# Patient Record
Sex: MALE | Race: WHITE | URBAN - METROPOLITAN AREA
[De-identification: names, ages, dates, MRNs, and addresses within clinical notes are randomized per-mention and may not be internally consistent; named-entity substitution may affect disease eponyms.]

---

## 2019-03-01 ENCOUNTER — TRANSFERRED RECORDS (OUTPATIENT)
Dept: HEALTH INFORMATION MANAGEMENT | Facility: CLINIC | Age: 69
End: 2019-03-01

## 2019-03-13 ENCOUNTER — TELEPHONE (OUTPATIENT)
Dept: GASTROENTEROLOGY | Facility: CLINIC | Age: 69
End: 2019-03-13

## 2019-03-13 NOTE — TELEPHONE ENCOUNTER
[facility]     [facility]: [facility], provider [facility]    Referred to: [facility]     Provider Requested: NA      Referral Received: 3/13/19     Records received: 3/13/19, scanned     Images received: CT and US in PACs- had MRI 3/14, is being sent ASAP    Evaluation for: Pancreatic Mass, liver mass on CT     Clinical History (per RN review):     Per Patient  Pain for  1.5 years on right side, felt like kidney stones. Some work up in 2018, blamed on hiatal hernia.  Pain still persistent, pursued more imaging, now more in stomach. No nausea/vomiting. Early satiety, unknown weight loss. Denies jaundice.     [facility]    CT Abd/Pelvis 3/1/19    Impression:  1. There is an 8.5mm nonenhancing mass on the tail of the pancreas.Extrahepatic common bile duct is 1.1cm. An obstructing mass at the distal common bile duct is not seen. There is a 1.5 cm low- attenuation mass of the posterior right lobe of the liver superiorly. Recommend MRI abdomen/MRCP with and without contrast to evaulate all these areas.     2. A couple small right renal masses favor cysts but are too small to characterize. These also can be seen on abdomen MRI    ABD US 2/19/19    Liver: There is a 1.5cm low-attenuation mass of the posterior right lobe on the liver superiorly at the dome of the diaphragm. This was not seen on prior ultrasound. However focused ultrasound was not performed at that location.    Impression:  1. Dilation of extrahepatic common bile duct measuring up to 1.2cm with mild intrahepatic biliary dilation. The pancreas is not well visualized secondary to overlying bowel gas. Recommend pancreatic mass protocol MRI or CT to further eval.     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     MD review date:  3/15/19    MD Decision for clinic consultation/Orders:            Referral updates/Patient contacted: 3/13/19 pt called           [facility] Call Center    Phone Message    May a detailed message be left on voicemail: Unknown     Reason for Call: Other: [facility]      Referring/Requesting provider and Health care System:    [facility]: [facility]    Clinic contact - Phone Number:  [phone]   [phone]    Requested provider:   None    Has patient been evaluated in clinic or had a procedure [facility] provider in the last 5 years: Unknown      Indication/Diagnosis for consultation:  Pancreatic Mass & Liver Mass on CT    Is diagnosis on list of approved diagnosis:    Yes    Has patient been evaluated by another Gastroenterologist?    Unknown      CT scan     Yes: 1/26/2018     MRI         No    Upper Endoscopy/EGD   No     Endoscopic Ultrasound/EUS Yes: 2/12/2019    ERCP      No    Colonoscopy    No    Are images able/being pushed to our system?   No     Is patient aware of request for clinic consultation and ok to be contacted to schedule?  Unknown     Action Taken: Message routed to:  [facility] KO

## 2019-03-14 ENCOUNTER — TRANSFERRED RECORDS (OUTPATIENT)
Dept: HEALTH INFORMATION MANAGEMENT | Facility: CLINIC | Age: 69
End: 2019-03-14

## 2019-03-15 ENCOUNTER — CARE COORDINATION (OUTPATIENT)
Dept: GASTROENTEROLOGY | Facility: CLINIC | Age: 69
End: 2019-03-15

## 2019-03-15 DIAGNOSIS — K86.89 PANCREATIC MASS: Primary | ICD-10-CM

## 2019-03-18 ENCOUNTER — DOCUMENTATION ONLY (OUTPATIENT)
Dept: GASTROENTEROLOGY | Facility: CLINIC | Age: 69
End: 2019-03-18

## 2019-03-20 ENCOUNTER — TELEPHONE (OUTPATIENT)
Dept: GASTROENTEROLOGY | Facility: CLINIC | Age: 69
End: 2019-03-20

## 2019-03-20 ENCOUNTER — CARE COORDINATION (OUTPATIENT)
Dept: GASTROENTEROLOGY | Facility: CLINIC | Age: 69
End: 2019-03-20

## 2019-03-20 NOTE — TELEPHONE ENCOUNTER
Called Kayy to follow up regarding referral for Guillaume. Providers are not pursuing a procedure at this time due to a reassuring MRI from last week.Will update patient.     Latha Kennedy RN Care Coordinator

## 2019-03-20 NOTE — TELEPHONE ENCOUNTER
[facility] Call Center    Phone Message    May a detailed message be left on voicemail: yes    Reason for Call: Other: Kayy from the [facility] is calling because the referring provider wants this pt seen asap. Please call pt for scheduling; Kayy would also like a call to inform her once pt has been scheduled. Thanks!      Action Taken: Message routed to:  [facility] Baldo Hernandez

## 2019-03-20 NOTE — TELEPHONE ENCOUNTER
Referral reviewed by Dr. Jayme Mireles. Although we had originally discussed and EUS, MRI from 3/14 is reassuring and Dr Mireles feels that a procedure is not currently necessary.     Reviewed findings of MRI of pancreatic cyst that can be monitored again in 1 year and a liver hemangioma that doesn't require intervention.     On speaker phone, told patient and wife that can follow up in clinic and we can discuss options if they choose.    Pt/wife state the pain over the patients kidney is very bad, he's taking oxycodone every 6 hours and asked if I could explain the MRI read of the kidney. I said I could not, but if they wanted to pursue that they should [facility] for [facility] and ask for referral to nephrology. I stressed the importance of getting clearance from the [facility] before seeing an MD outside that system to ensure coverage.     Pt/wife stated they understood and will follow up with [facility] for concerns about Guillaume's kidneys.    Latha Kennedy, RN Care Coordinator

## 2019-03-21 ENCOUNTER — CARE COORDINATION (OUTPATIENT)
Dept: GASTROENTEROLOGY | Facility: CLINIC | Age: 69
End: 2019-03-21

## 2019-03-21 DIAGNOSIS — R10.84 ABDOMINAL PAIN, GENERALIZED: Primary | ICD-10-CM

## 2019-03-21 NOTE — PROGRESS NOTES
Conference call with Kayy from [facility], Geeta PORTILLO, referring MD's RN and pts wife, Shelly Murphy.    Reviewed referral for care into our system. [facility] is requesting that pts referral be sent on to general surgery, as that was their original request. Based on most recent imaging, MRI, no panc/bili interventions at this time. Offered to follow up in clinic here but [facility] requesting that I forward case on to General Surgery, as patient is in extreme amounts of pain on his right side and [facility] has no other options. Requesting we forward in the interest of efficiency in getting care/relief for the patient.    Will place order for Gen Surg per [facility] request.     Latha Kennedy, LINDSEY Care Coordinator

## 2019-03-21 NOTE — PROGRESS NOTES
[facility] Referral # [id number]    Called ENRRIQUE Sultana of [facility], [phone]

## 2019-03-22 ENCOUNTER — DOCUMENTATION ONLY (OUTPATIENT)
Dept: SURGERY | Facility: CLINIC | Age: 69
End: 2019-03-22

## 2019-03-22 ENCOUNTER — TELEPHONE (OUTPATIENT)
Dept: SURGERY | Facility: CLINIC | Age: 69
End: 2019-03-22

## 2019-03-22 ENCOUNTER — DOCUMENTATION ONLY (OUTPATIENT)
Dept: CARE COORDINATION | Facility: CLINIC | Age: 69
End: 2019-03-22

## 2019-03-22 NOTE — PROGRESS NOTES
This writer called the [facility] to see if they would be able to send the patient's insurance information. This writer left a message asking them to call [phone] to discuss.

## 2019-03-22 NOTE — TELEPHONE ENCOUNTER
Patient called and confirmed the appointment on 04/12/2019 would work for him. He requested a text confirmation and this writer offered to send an email. The following information was sent to the patient:  Adama Galaviz,    Thank you for returning my call. You are scheduled to see Dr. Lorne Cobian on 04/12/2019 at 12:30 pm. We do ask our patients to check in 15 minutes before their appointment. We do request that you bring your insurance and a photo ID to your appointment.  Our clinic is on the fourth floor and the address is:    [address] [place] [id number]    Please let me know if you have any questions or concerns.     Sincerely,  Shea

## 2019-03-22 NOTE — TELEPHONE ENCOUNTER
"----- Message from Brenda Gil RN sent at 3/22/2019 11:55 AM CDT -----  Regarding: FW: [facility] Referral - [facility]  Can you please schedule him for a consult for abdominal pain? I think he would be best served with .    I need to watch for records from [facility].    Brenda  ----- Message -----  From: Latha Kennedy RN  Sent: 3/22/2019  11:36 AM  To: Brenda Gil RN  Subject: RE: Gen Surg Referral - [facility]                       All records sent have been scanned but [facility] didn't send a ton. Most recent imaging on PACs (US, CT and MRI) Details of imaging and history in my referral note on 3/13. 3/19/19.MRI read scanned in under Media. Labs so far have been unremarkable from a panc/bili perspective.    He originally referred here for pain- pancreatic mass on turned out to be a cyst that we can monitor. Liver \"mass\" is hemangioma, non- concerning. His MRI on 3/19 was received after we started his referral and was reassuring, no interventions recommended.    Pain is persistent, but comes and goes, mostly on his right flank. He also has low back pain and when both flare, he's miserable. Feels backed up and like he can't swallow sometimes, is s/p Robinson for hiatal hernia.      MRI read suggests renal cyst (?) in the upper pole of right kidney. I suggested referral to nephrology, [facility] persisted that they asked for Gen Surg and want to continue that. Wife and patient wondering if that is cause of pain. He's using oxycodone every 4 hours, his [facility] doc seems to be out of ideas. Last colonscopy was in 2018 and unremarkable (didn't get imaging but was on call with [facility] RN\"s yesterday and they mentioned it)    Let me know if you have other questions- really nice israel and wife, [facility] unsure how to help them.     Thank you!  Latha       ----- Message -----  From: Brenda Gil RN  Sent: 3/22/2019  11:16 AM  To: Latha Kennedy RN  Subject: RE: Gen Surg Referral - [facility]                       Latha,    We need records to review to determine which of our " providers should see him- where is the pain, etc?  I see nothing scanned into Epic other than some imaging/lab.  Do you have them?    Thanks,  Brenda    ----- Message -----  From: Latha Kennedy RN  Sent: 3/22/2019  10:57 AM  To: Brenda Gil RN  Subject: Gen Surg Referral - [facility]                           Hi-    We placed Gen Surg referral yesterday for this patient on the request of [facility] to expedite care. Man with ongoing pain, panc and liver work up unremarkable and no intervention from panc/bili. [facility] would like Gen Surg to review. Should all be in the chart.    Let me know if you have questions. Thanks for your help.    Latha Kennedy, RN Care Coordinator

## 2019-03-22 NOTE — TELEPHONE ENCOUNTER
Per task, called patient to schedule a clinic consult with Dr. Colton Cobian , there was no answer. Left message with an appointment on 04/12/2019 and this writer's direct line [phone]. Patient was asked to call to confirm.

## 2019-03-27 ENCOUNTER — TELEPHONE (OUTPATIENT)
Dept: SURGERY | Facility: CLINIC | Age: 69
End: 2019-03-27

## 2019-03-27 NOTE — TELEPHONE ENCOUNTER
This writer received a call from Kayy at the [facility] about the patient. This writer confirmed the patient has an appointment with Dr. Lorne Cobian on 04/12/2019 at 12:30 pm. She appreciated this information and can be contacted at [phone] ext. [phone].

## 2019-04-03 ENCOUNTER — PATIENT OUTREACH (OUTPATIENT)
Dept: SURGERY | Facility: CLINIC | Age: 69
End: 2019-04-03

## 2019-04-03 NOTE — PROGRESS NOTES
Patient is scheduled to see Dr. Cobian on 4/12 for evaluation of abdominal pain.  No records available in Epic other than imaging and lab.  Provider in need of OP and provider notes.     Attempted to contact Alesha at the [facility] with no answer.  LM on  to call office and in need of records for appointment.  Await return call.    Reason for Call: Other: [facility]      Referring/Requesting provider and Health care System:    [facility]: [facility]     Clinic contact - Phone Number:  [phone]   [phone]

## 2019-04-05 NOTE — PROGRESS NOTES
LM x 2 to obtain records for upcoming appointment with Dr. Cobian at [facility].

## 2019-04-09 ENCOUNTER — TELEPHONE (OUTPATIENT)
Dept: SURGERY | Facility: CLINIC | Age: 69
End: 2019-04-09

## 2019-04-09 NOTE — TELEPHONE ENCOUNTER
[facility] Call Center    Phone Message    May a detailed message be left on voicemail: yes    Reason for Call: Other: Patient's wife Dennise called to reschedule pt's 4/12/19 appointment with Dr. Cobian due to incoming weather. Travel will be too difficult.  Patient needs to be worked into Dr. Cobian's schedule as next available is 5/13/19.  Please call Dennise at [phone] or Guillaume at [phone] to reschedule for next week.    Action Taken: Message routed to:  [facility] General Surgery

## 2019-04-09 NOTE — PROGRESS NOTES
Attempted to reach [facility] with no answer. Left detailed message requesting medical records for appointment with Dr. Cobian 4/12.    KEN provided with fax and direct contact numbers.

## 2019-04-10 ENCOUNTER — PATIENT OUTREACH (OUTPATIENT)
Dept: SURGERY | Facility: CLINIC | Age: 69
End: 2019-04-10

## 2019-04-10 ENCOUNTER — TELEPHONE (OUTPATIENT)
Dept: SURGERY | Facility: CLINIC | Age: 69
End: 2019-04-10

## 2019-04-10 RX ORDER — ASPIRIN 81 MG/1
81 TABLET ORAL DAILY
COMMUNITY

## 2019-04-10 RX ORDER — MULTIVITAMIN,THERAPEUTIC
1 TABLET ORAL DAILY
COMMUNITY

## 2019-04-10 RX ORDER — SODIUM FLUORIDE 5 MG/G
GEL, DENTIFRICE DENTAL AT BEDTIME
COMMUNITY

## 2019-04-10 RX ORDER — ACETAMINOPHEN 325 MG/1
325-650 TABLET ORAL EVERY 6 HOURS PRN
COMMUNITY

## 2019-04-10 RX ORDER — CALCIUM POLYCARBOPHIL 625 MG 625 MG/1
1 TABLET ORAL DAILY
COMMUNITY

## 2019-04-10 NOTE — TELEPHONE ENCOUNTER
Appointment rescheduled to 4/18 at 0830 with Dr. Soto.  Dr. Cobian not available in time period patient requesting.  [facility]ed [facility] from [facility]. Patients spouse will fax records that she has (OP).

## 2019-04-10 NOTE — PROGRESS NOTES
Chart notes reviewed and concern may not be best suited for General Surgery.  Message to team to assist with chart review and recommendations.    Patient/spouse aware that appointment with Dr. Soto 4/18 will be cancelled as he is not the correct provider for patients concerns.    P:  Call patient once chart reviewed with an update.

## 2019-04-11 NOTE — PROGRESS NOTES
Records reviewed by Dr. Santamaria (General Surgery/Bariatrics).  He recommends that the patient continue to follow in [place] with the team that has been treating him.     If the patient is in need of a procedure such as a re-do Nissen the patient should be referred to Thoracic, Dr. Johnston et al.    Called [facility] office, and spoke with Geeta ([phone]).      Note faxed to [phone]     Called patients home to relay information. No answer. Left brief message to call office to discuss recommendations.

## 2021-12-09 NOTE — PROGRESS NOTES
Advised that our team is recommending an EUS to further work up panc/liver masses. Pt states he's had many endoscopic procedures. Takes 81mg ASA, advised to hold 5 days pre procedure. Pt has no exclusions for MAC. Order placed for EUS in endo. Told pt they would reach out to schedule.    Latha Kennedy, RN Care Coordinator    
Home